# Patient Record
Sex: FEMALE | Race: WHITE | NOT HISPANIC OR LATINO | URBAN - METROPOLITAN AREA
[De-identification: names, ages, dates, MRNs, and addresses within clinical notes are randomized per-mention and may not be internally consistent; named-entity substitution may affect disease eponyms.]

---

## 2022-01-11 ENCOUNTER — OFFICE (OUTPATIENT)
Dept: URBAN - METROPOLITAN AREA CLINIC 12 | Facility: CLINIC | Age: 62
End: 2022-01-11

## 2022-01-11 VITALS
HEIGHT: 63 IN | SYSTOLIC BLOOD PRESSURE: 135 MMHG | HEART RATE: 63 BPM | OXYGEN SATURATION: 99 % | WEIGHT: 108 LBS | DIASTOLIC BLOOD PRESSURE: 70 MMHG

## 2022-01-11 DIAGNOSIS — K90.0 CELIAC DISEASE: ICD-10-CM

## 2022-01-11 DIAGNOSIS — D51.8 OTHER VITAMIN B12 DEFICIENCY ANEMIAS: ICD-10-CM

## 2022-01-11 LAB
CBC, PLATELET, NO DIFFERENTIAL: HEMATOCRIT: 45 % (ref 34–46.6)
CBC, PLATELET, NO DIFFERENTIAL: HEMOGLOBIN: 14 G/DL (ref 11.1–15.9)
CBC, PLATELET, NO DIFFERENTIAL: MCH: 30.8 PG (ref 26.6–33)
CBC, PLATELET, NO DIFFERENTIAL: MCHC: 31.1 G/DL — LOW (ref 31.5–35.7)
CBC, PLATELET, NO DIFFERENTIAL: MCV: 99 FL — HIGH (ref 79–97)
CBC, PLATELET, NO DIFFERENTIAL: PLATELETS: 287 X10E3/UL (ref 150–450)
CBC, PLATELET, NO DIFFERENTIAL: RBC: 4.54 X10E6/UL (ref 3.77–5.28)
CBC, PLATELET, NO DIFFERENTIAL: RDW: 12.5 % (ref 11.7–15.4)
CBC, PLATELET, NO DIFFERENTIAL: WBC: 7.2 X10E3/UL (ref 3.4–10.8)
COMP. METABOLIC PANEL (14): A/G RATIO: 1.8 (ref 1.2–2.2)
COMP. METABOLIC PANEL (14): ALBUMIN: 4.3 G/DL (ref 3.8–4.8)
COMP. METABOLIC PANEL (14): ALKALINE PHOSPHATASE: 76 IU/L (ref 44–121)
COMP. METABOLIC PANEL (14): ALT (SGPT): 44 IU/L — HIGH (ref 0–32)
COMP. METABOLIC PANEL (14): AST (SGOT): 33 IU/L (ref 0–40)
COMP. METABOLIC PANEL (14): BILIRUBIN, TOTAL: 0.4 MG/DL (ref 0–1.2)
COMP. METABOLIC PANEL (14): BUN/CREATININE RATIO: 26 (ref 12–28)
COMP. METABOLIC PANEL (14): BUN: 16 MG/DL (ref 8–27)
COMP. METABOLIC PANEL (14): CALCIUM: 9.8 MG/DL (ref 8.7–10.3)
COMP. METABOLIC PANEL (14): CARBON DIOXIDE, TOTAL: 26 MMOL/L (ref 20–29)
COMP. METABOLIC PANEL (14): CHLORIDE: 103 MMOL/L (ref 96–106)
COMP. METABOLIC PANEL (14): CREATININE: 0.61 MG/DL (ref 0.57–1)
COMP. METABOLIC PANEL (14): EGFR IF AFRICN AM: 112 ML/MIN/1.73 (ref 59–?)
COMP. METABOLIC PANEL (14): EGFR IF NONAFRICN AM: 97 ML/MIN/1.73 (ref 59–?)
COMP. METABOLIC PANEL (14): GLOBULIN, TOTAL: 2.4 G/DL (ref 1.5–4.5)
COMP. METABOLIC PANEL (14): GLUCOSE: 99 MG/DL (ref 65–99)
COMP. METABOLIC PANEL (14): POTASSIUM: 5 MMOL/L (ref 3.5–5.2)
COMP. METABOLIC PANEL (14): PROTEIN, TOTAL: 6.7 G/DL (ref 6–8.5)
COMP. METABOLIC PANEL (14): SODIUM: 140 MMOL/L (ref 134–144)
ENDOMYSIAL ANTIBODY IGA: NEGATIVE
FE+TIBC+FER: FERRITIN: 48 NG/ML (ref 15–150)
FE+TIBC+FER: IRON BIND.CAP.(TIBC): 321 UG/DL (ref 250–450)
FE+TIBC+FER: IRON SATURATION: 51 % (ref 15–55)
FE+TIBC+FER: IRON: 164 UG/DL — HIGH (ref 27–139)
FE+TIBC+FER: UIBC: 157 UG/DL (ref 118–369)
IMMUNOGLOBULIN A, QN, SERUM: 201 MG/DL (ref 87–352)
PROTHROMBIN TIME (PT): INR: 0.9 (ref 0.9–1.2)
PROTHROMBIN TIME (PT): PROTHROMBIN TIME: 9.8 SEC (ref 9.1–12)
T-TRANSGLUTAMINASE (TTG) IGA: <2 U/ML
VITAMIN A, SERUM: VITAMIN A: 45.9 UG/DL (ref 22–69.5)
VITAMIN B12 AND FOLATE: FOLATE (FOLIC ACID), SERUM: 6.6 NG/ML (ref 3–?)
VITAMIN B12 AND FOLATE: VITAMIN B12: 213 PG/ML — LOW (ref 232–1245)
VITAMIN D, 25-HYDROXY: 34.9 NG/ML (ref 30–100)
VITAMIN E: VITAMIN E(ALPHA TOCOPHEROL): 10.4 MG/L (ref 9–29)
VITAMIN E: VITAMIN E(GAMMA TOCOPHEROL): 0.7 MG/L (ref 0.5–4.9)
ZINC, PLASMA OR SERUM: 86 UG/DL (ref 44–115)

## 2022-01-11 PROCEDURE — 99204 OFFICE O/P NEW MOD 45 MIN: CPT | Performed by: INTERNAL MEDICINE

## 2022-01-11 NOTE — SERVICEHPINOTES
Ms. Macias is a 61-year-old female with celiac disease here to establish care. The patient states that she was in her normal state of health up until July 2020 when she became very ill and it was believed that she likely had Covid. At that time she has persistent issues with nausea, vomiting, abdominal pain, diarrhea, and had a 60 lb weight loss. Because of this she was seen by Dr. Tom Ritter in [place], [place] who apparently performed stool studies and colonoscopy which were unremarkable. She underwent EGD were biopsies reportedly revealed celiac disease. Because of this she was started on a gluten free diet and she states since then has had significant improvement. She has been able to gain 5 lb back and is otherwise had stable weight. She states that her strength is much improved and she feels very well. She does drink protein shakes and supplements on a regular basis. Her bowel movements are regular and normal. She denies any rectal bleeding, fevers, chills, nausea, vomiting, or abdominal pain. She will have an occasional loose bowel movement depending if she eats something that may have some gluten. There is no first-degree family history of colon cancer or colon polyps.

## 2022-01-11 NOTE — SERVICENOTES
Overall the patient does appear to be doing well from a celiac standpoint.  We did discuss the importance of a gluten free diet the patient is interested in speaking with the dietitian regarding gluten free diet.  The meantime we will check basic blood work as above to evaluate for any nutritional deficiencies.  Of note, she states that she has had a history of B12 deficiency in the past for which she receives injections from her PCP.  I did also recommend to the patient that she undergo a pneumonia vaccine and if she has not had any recent Dexa scan should discuss DEXA scan with her PCP.

## 2022-12-05 ENCOUNTER — OFFICE (OUTPATIENT)
Dept: URBAN - METROPOLITAN AREA CLINIC 11 | Facility: CLINIC | Age: 62
End: 2022-12-05

## 2022-12-05 VITALS
HEART RATE: 58 BPM | HEIGHT: 63 IN | DIASTOLIC BLOOD PRESSURE: 58 MMHG | WEIGHT: 108 LBS | OXYGEN SATURATION: 96 % | SYSTOLIC BLOOD PRESSURE: 131 MMHG

## 2022-12-05 DIAGNOSIS — D51.8 OTHER VITAMIN B12 DEFICIENCY ANEMIAS: ICD-10-CM

## 2022-12-05 DIAGNOSIS — R11.0 NAUSEA: ICD-10-CM

## 2022-12-05 DIAGNOSIS — K90.0 CELIAC DISEASE: ICD-10-CM

## 2022-12-05 DIAGNOSIS — R19.7 DIARRHEA, UNSPECIFIED: ICD-10-CM

## 2022-12-05 PROCEDURE — 99214 OFFICE O/P EST MOD 30 MIN: CPT | Performed by: INTERNAL MEDICINE

## 2022-12-05 RX ORDER — SODIUM PICOSULFATE, MAGNESIUM OXIDE, AND ANHYDROUS CITRIC ACID 10; 3.5; 12 MG/160ML; G/160ML; G/160ML
LIQUID ORAL
Qty: 320 | Refills: 0 | Status: ACTIVE
Start: 2022-12-05

## 2022-12-05 NOTE — SERVICENOTES
Given her worsening diarrhea I do think it is important that we rule out celiac disease as a cause we will check her celiac labs and also have her undergo EGD with biopsies.  We will do colonoscopy the same time with biopsy to rule out inflammatory bowel disease.  We will also check other studies such as stool studies and blood work as above.  If the above workup is negative we can consider trial of colestipol, Xifaxan, etc.

## 2022-12-05 NOTE — SERVICEHPINOTES
Ms. Macias is a 62-year-old female here for follow up. Overall the patient has been doing relatively well since we last saw her. She continues to be on a gluten free diet. She has notice some worsening diarrhea over the past year and states that she does have bowel movements a few days out of the week usually three or 4 times a day. She cannot figure out what particular foods or causing her symptoms. It is worse with nuts or popcorn. She denies any weight loss, fevers, or chills. She does have a family history of colon polyps in her father in his 60s but no first-degree family history of colon cancer. She is uncertain when her last colonoscopy was performed but thinks it may have been around 5-10 years ago.br
br Previous GI History:
br The patient states that she was in her normal state of health up until July 2020 when she became very ill and it was believed that she likely had Covid. At that time she has persistent issues with nausea, vomiting, abdominal pain, diarrhea, and had a 60 lb weight loss. Because of this she was seen by Dr. Tom Ritter in [place], [place] who apparently performed stool studies which were unremarkable. She underwent EGD were biopsies reportedly revealed celiac disease. Because of this she was started on a gluten free diet and she states since then has had significant improvement. She has been able to gain 5 lb back and is otherwise had stable weight. She states that her strength is much improved and she feels very well. She does drink protein shakes and supplements on a regular basis. There is no first-degree family history of colon cancer or colon polyps.

## 2022-12-07 LAB
ANTIGLIADIN ABS, IGG: DEAMIDATED GLIADIN ABS, IGG: 1 UNITS (ref 0–19)
C-REACTIVE PROTEIN, QUANT: <1 MG/L
CBC, PLATELET, NO DIFFERENTIAL: HEMATOCRIT: 41.6 % (ref 34–46.6)
CBC, PLATELET, NO DIFFERENTIAL: HEMOGLOBIN: 13.6 G/DL (ref 11.1–15.9)
CBC, PLATELET, NO DIFFERENTIAL: MCH: 31.6 PG (ref 26.6–33)
CBC, PLATELET, NO DIFFERENTIAL: MCHC: 32.7 G/DL (ref 31.5–35.7)
CBC, PLATELET, NO DIFFERENTIAL: MCV: 97 FL (ref 79–97)
CBC, PLATELET, NO DIFFERENTIAL: NRBC: (no result)
CBC, PLATELET, NO DIFFERENTIAL: PLATELETS: 271 X10E3/UL (ref 150–450)
CBC, PLATELET, NO DIFFERENTIAL: RBC: 4.31 X10E6/UL (ref 3.77–5.28)
CBC, PLATELET, NO DIFFERENTIAL: RDW: 12 % (ref 11.7–15.4)
CBC, PLATELET, NO DIFFERENTIAL: WBC: 6.8 X10E3/UL (ref 3.4–10.8)
COMP. METABOLIC PANEL (14): A/G RATIO: 2.1 (ref 1.2–2.2)
COMP. METABOLIC PANEL (14): ALBUMIN: 4.1 G/DL (ref 3.8–4.8)
COMP. METABOLIC PANEL (14): ALKALINE PHOSPHATASE: 69 IU/L (ref 44–121)
COMP. METABOLIC PANEL (14): ALT (SGPT): 42 IU/L — HIGH (ref 0–32)
COMP. METABOLIC PANEL (14): AST (SGOT): 38 IU/L (ref 0–40)
COMP. METABOLIC PANEL (14): BILIRUBIN, TOTAL: 0.3 MG/DL (ref 0–1.2)
COMP. METABOLIC PANEL (14): BUN/CREATININE RATIO: 12 (ref 12–28)
COMP. METABOLIC PANEL (14): BUN: 7 MG/DL — LOW (ref 8–27)
COMP. METABOLIC PANEL (14): CALCIUM: 9 MG/DL (ref 8.7–10.3)
COMP. METABOLIC PANEL (14): CARBON DIOXIDE, TOTAL: 24 MMOL/L (ref 20–29)
COMP. METABOLIC PANEL (14): CHLORIDE: 103 MMOL/L (ref 96–106)
COMP. METABOLIC PANEL (14): CREATININE: 0.58 MG/DL (ref 0.57–1)
COMP. METABOLIC PANEL (14): EGFR: 101 ML/MIN/1.73 (ref 59–?)
COMP. METABOLIC PANEL (14): GLOBULIN, TOTAL: 2 G/DL (ref 1.5–4.5)
COMP. METABOLIC PANEL (14): GLUCOSE: 95 MG/DL (ref 70–99)
COMP. METABOLIC PANEL (14): POTASSIUM: 4.5 MMOL/L (ref 3.5–5.2)
COMP. METABOLIC PANEL (14): PROTEIN, TOTAL: 6.1 G/DL (ref 6–8.5)
COMP. METABOLIC PANEL (14): SODIUM: 138 MMOL/L (ref 134–144)
ENDOMYSIAL ANTIBODY IGA: NEGATIVE
FE+TIBC+FER: FERRITIN: 57 NG/ML (ref 15–150)
FE+TIBC+FER: IRON BIND.CAP.(TIBC): 323 UG/DL (ref 250–450)
FE+TIBC+FER: IRON SATURATION: 36 % (ref 15–55)
FE+TIBC+FER: IRON: 115 UG/DL (ref 27–139)
FE+TIBC+FER: UIBC: 208 UG/DL (ref 118–369)
IMMUNOGLOBULIN A, QN, SERUM: 203 MG/DL (ref 87–352)
SEDIMENTATION RATE-WESTERGREN: 2 MM/HR (ref 0–40)
T-TRANSGLUTAMINASE (TTG) IGA: <2 U/ML
VITAMIN B12 AND FOLATE: FOLATE (FOLIC ACID), SERUM: 5.3 NG/ML (ref 3–?)
VITAMIN B12 AND FOLATE: VITAMIN B12: 251 PG/ML (ref 232–1245)

## 2022-12-12 LAB
